# Patient Record
(demographics unavailable — no encounter records)

---

## 2024-10-14 NOTE — REASON FOR VISIT
[FreeTextEntry2] :  Left Shoulder/ right Hand Pain has resolved. Patient is here today for left hand and right shoulder 3 week onset

## 2024-10-14 NOTE — ASSESSMENT
[FreeTextEntry1] : The patient was advised of the diagnosis. The natural history of the pathology was explained in full to the patient in layman's terms. All questions were answered. The risks and benefits of surgical and non-surgical treatment alternatives were explained in full to the patient.  We reviewed the anatomy of the flexor sheath and pathology of trigger fingers with the use of drawings and discussion.  We discussed the treatment options including splinting/nsaids, injection and surgery.  We discussed that too many injections may lead to weakening o the tendon/tendon rupture and the safety of two injections. After a discussion of the risks, benefits and alternatives along with the expectations, the patient was amenable to injection.  The indication for injection is pain and inflammation.  The skin overlying the tendon sheath/A1 pulley site was prepared with alcohol and ethyl chloride was sprayed topically.  Sterile technique was used. An injection of 1ml of lidocaine and 6mg of betamethasone was used.  The patient was instructed to call if redness, pain or fever occur.  They ay apply ice for 15 minutes eery hour for the next 12-24 hours as tolerated.  .  The patient understands that it may take 2-5 days to see a noticeable difference.  Sterile Band-Aid was applied.   Left middle trigger finger CSI #1 performed today.  Right shoulder subacromial CSI today.  Large joint injection of the right shoulder was performed. The indication for this procedure was pain, inflammation. The site was prepped with alcohol, ethyl chloride sprayed topically and sterile technique used. An injection of Lidocaine 3cc of 2% , Bupivacaine (Marcaine) 3cc of 0.5% , Triamcinolone (Kenalog) 2cc of 40 mg  was used.   Patient tolerated procedure well. Patient was advised to call if redness, pain or fever occur and apply ice for 15 minutes out of every hour for the next 12-24 hours as tolerated. The risks benefits, and alternatives have been discussed, and verbal consent was obtained.   Pt is under the care of Pain Management

## 2024-10-14 NOTE — IMAGING
[de-identified] : Left middle TTP a1 pulley +triggering otherwise farom neurovascular intact distally  Cervical spine with limited extension and right lateral motion. + right Spurling Sign DTRs and strength are wnls symmetrically. gait is normal mildly + RIGHT shoulder impingement signs with mild left lateral subacromial TTP noted. mild anterior swelling appreciated (minimal effusion) ROM isto 100 deg in flexion and abduction due to discomfort  no ligamentous laxity mild pain on abduction.  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

## 2024-10-14 NOTE — HISTORY OF PRESENT ILLNESS
[de-identified] : 10/14/2024: Pt here today with complaint of right shoulder pain x 3 weeks with no hx of trauma. Pt states she noted swelling and limited motion. Pt also complains of left middle finger triggering.   09/09/2024: Patient is here for evaluation of left shoulder pain radiating down left hand. Patient is also here to follow up on right hand trigger finger, wants to discuss procedure. Pt has had 2 previous right middle finger CSIs, however ring finger is triggering today.

## 2024-12-03 NOTE — IMAGING
[de-identified] : Left middle nTTP a1 pulley - triggering otherwise farom neurovascular intact distally  Cervical spine with limited extension and right lateral motion. - right Spurling Sign DTRs and strength are wnls symmetrically. gait is normal mildly + RIGHT shoulder impingement signs with mild left lateral subacromial TTP noted. mild anterior swelling appreciated (minimal effusion) ROM isto 100 deg in flexion and abduction due to discomfort  no ligamentous laxity mild pain on abduction.  right shoulder with full ROM / pain on flexion and IROM Posterior Lift Off cannot be performed. exam limited due to guarding TTP over the lateral shoulder. no ligamentous laxity.    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

## 2024-12-03 NOTE — HISTORY OF PRESENT ILLNESS
[de-identified] :  12/3/24: Pt is here for follow up for LT shoulder. Pt states that she is here for RT shoulder pain. States that she slipped in the shower on 11/22/24. Went to [facility] and got x-rays.  Was told that she had no fractures.   10/14/2024: Pt here today with complaint of right shoulder pain x 3 weeks with no hx of trauma. Pt states she noted swelling and limited motion. Pt also complains of left middle finger triggering.   09/09/2024: Patient is here for evaluation of left shoulder pain radiating down left hand. Patient is also here to follow up on right hand trigger finger, wants to discuss procedure. Pt has had 2 previous right middle finger CSIs, however ring finger is triggering today.

## 2024-12-03 NOTE — ASSESSMENT
[FreeTextEntry1] : The patient was advised of the diagnosis. The natural history of the pathology was explained in full to the patient in layman's terms. All questions were answered. The risks and benefits of surgical and non-surgical treatment alternatives were explained in full to the patient.  referred for mri right shoulder.  rto s/p MRI to discuss possible surgical intervention.

## 2024-12-09 NOTE — REASON FOR VISIT
[FreeTextEntry2] :  Left Shoulder/ right Hand Pain has resolved. Patient is here today for left hand and right shoulder 3 week onset  12/09/2024: here today for MRI results of R shoulder, states no changes since last visit.

## 2024-12-09 NOTE — HISTORY OF PRESENT ILLNESS
[de-identified] : 12/9/24: shoulder has some movement, but pain persists s/p MRI. Images reviewed by me today. My independent interpretation of this test is chronic rotator cuff tear artthropathy with new injury on top of it  12/3/24: Pt is here for follow up for LT shoulder. Pt states that she is here for RT shoulder pain. States that she slipped in the shower on 11/22/24. Went to [facility] and got x-rays.  Was told that she had no fractures.   10/14/2024: Pt here today with complaint of right shoulder pain x 3 weeks with no hx of trauma. Pt states she noted swelling and limited motion. Pt also complains of left middle finger triggering.   09/09/2024: Patient is here for evaluation of left shoulder pain radiating down left hand. Patient is also here to follow up on right hand trigger finger, wants to discuss procedure. Pt has had 2 previous right middle finger CSIs, however ring finger is triggering today.

## 2024-12-09 NOTE — ASSESSMENT
[FreeTextEntry1] : The patient was advised of the diagnosis. The natural history of the pathology was explained in full to the patient in layman's terms. All questions were answered. The risks and benefits of surgical and non-surgical treatment alternatives were explained in full to the patient.  exacerbation of chronic condition discussed nature of the injury- options include benign neglect, injection, arthroplasty- not interested in arthroplasty now will try injection  Large joint injection of the right shoulder was performed. The indication for this procedure was pain, inflammation and x-ray evidence of Osteoarthritis on this or prior visit. The site was prepped with alcohol, ethyl chloride sprayed topically and sterile technique used. An injection of Lidocaine 3cc of 2% , Bupivacaine (Marcaine) 3cc of 0.5% , Triamcinolone (Kenalog) 2cc of 40 mg  was used.   Patient tolerated procedure well. Patient was advised to call if redness, pain or fever occur and apply ice for 15 minutes out of every hour for the next 12-24 hours as tolerated. The risks benefits, and alternatives have been discussed, and verbal consent was obtained.

## 2024-12-09 NOTE — IMAGING
[de-identified] : Cervical spine with limited extension and right lateral motion. - right Spurling Sign DTRs and strength are wnls symmetrically. gait is normal mildly + RIGHT shoulder impingement signs with mild left lateral subacromial TTP noted. mild anterior swelling appreciated (minimal effusion) ROM isto 100 deg in flexion and abduction due to discomfort  no ligamentous laxity mild pain on abduction.  right shoulder with full ROM / pain on flexion and IROM Posterior Lift Off cannot be performed. exam limited due to guarding TTP over the lateral shoulder. no ligamentous laxity.

## 2025-05-06 NOTE — IMAGING
[de-identified] : Cervical spine with limited extension and right lateral motion. - right Spurling Sign DTRs and strength are wnls symmetrically. gait is normal mildly + RIGHT shoulder impingement signs with mild left lateral subacromial TTP noted. mild anterior swelling appreciated (minimal effusion) ROM isto 100 deg in flexion and abduction due to discomfort  no ligamentous laxity mild pain on abduction.  right shoulder with full ROM / pain on flexion and IROM Posterior Lift Off cannot be performed. exam limited due to guarding TTP over the lateral shoulder. no ligamentous laxity.   right ring finger TTP a1 pulley +triggering otherwise farom neurovascular intact distally

## 2025-05-06 NOTE — HISTORY OF PRESENT ILLNESS
[de-identified] : 05/06/2025: Here for follow up for right shoulder, right ring finger. Has been experiencing locking of right ring finger for several months.  Right shoulder slowly improving with therapy  12/9/24: shoulder has some movement, but pain persists s/p MRI. Images reviewed by me today. My independent interpretation of this test is chronic rotator cuff tear artthropathy with new injury on top of it  12/3/24: Pt is here for follow up for LT shoulder. Pt states that she is here for RT shoulder pain. States that she slipped in the shower on 11/22/24. Went to [facility] and got x-rays.  Was told that she had no fractures.   10/14/2024: Pt here today with complaint of right shoulder pain x 3 weeks with no hx of trauma. Pt states she noted swelling and limited motion. Pt also complains of left middle finger triggering.   09/09/2024: Patient is here for evaluation of left shoulder pain radiating down left hand. Patient is also here to follow up on right hand trigger finger, wants to discuss procedure. Pt has had 2 previous right middle finger CSIs, however ring finger is triggering today.

## 2025-05-06 NOTE — ASSESSMENT
[FreeTextEntry1] : The patient was advised of the diagnosis. The natural history of the pathology was explained in full to the patient in layman's terms. All questions were answered. The risks and benefits of surgical and non-surgical treatment alternatives were explained in full to the patient.  CSI #2 performed to right ring finger TF.  I explained that steroids quickly control the illness by slowing the immune response. Since they slow the immune response generally and not in a specific way only against the disease, the body may have a more difficult time fighting infection and thus patients taking steroids are more susceptible to infections.   RTO 4 weeks

## 2025-05-13 NOTE — HISTORY OF PRESENT ILLNESS
[FreeTextEntry1] : The patient is here for a routine gynecological examination with Pap smear.  Her LMP was over ten years  No PMB     She has no recent gynecological or urinary problems